# Patient Record
Sex: FEMALE | Race: OTHER | HISPANIC OR LATINO | Employment: UNEMPLOYED | URBAN - METROPOLITAN AREA
[De-identification: names, ages, dates, MRNs, and addresses within clinical notes are randomized per-mention and may not be internally consistent; named-entity substitution may affect disease eponyms.]

---

## 2024-01-10 ENCOUNTER — APPOINTMENT (OUTPATIENT)
Dept: CARDIOLOGY | Facility: HOSPITAL | Age: 24
End: 2024-01-10

## 2024-01-10 ENCOUNTER — HOSPITAL ENCOUNTER (EMERGENCY)
Facility: HOSPITAL | Age: 24
Discharge: HOME | End: 2024-01-10
Attending: EMERGENCY MEDICINE

## 2024-01-10 ENCOUNTER — APPOINTMENT (OUTPATIENT)
Dept: RADIOLOGY | Facility: HOSPITAL | Age: 24
End: 2024-01-10

## 2024-01-10 VITALS
OXYGEN SATURATION: 98 % | HEART RATE: 66 BPM | TEMPERATURE: 97.3 F | DIASTOLIC BLOOD PRESSURE: 68 MMHG | HEIGHT: 60 IN | SYSTOLIC BLOOD PRESSURE: 116 MMHG | BODY MASS INDEX: 29.45 KG/M2 | RESPIRATION RATE: 18 BRPM | WEIGHT: 150 LBS

## 2024-01-10 DIAGNOSIS — R55 NEAR SYNCOPE: Primary | ICD-10-CM

## 2024-01-10 LAB
ALBUMIN SERPL BCP-MCNC: 4.6 G/DL (ref 3.4–5)
ALP SERPL-CCNC: 94 U/L (ref 33–110)
ALT SERPL W P-5'-P-CCNC: 15 U/L (ref 7–45)
ANION GAP SERPL CALC-SCNC: 12 MMOL/L (ref 10–20)
AST SERPL W P-5'-P-CCNC: 20 U/L (ref 9–39)
B-HCG SERPL-ACNC: <2 MIU/ML
BASOPHILS # BLD AUTO: 0.05 X10*3/UL (ref 0–0.1)
BASOPHILS NFR BLD AUTO: 0.3 %
BILIRUB SERPL-MCNC: 0.6 MG/DL (ref 0–1.2)
BUN SERPL-MCNC: 10 MG/DL (ref 6–23)
CALCIUM SERPL-MCNC: 9.7 MG/DL (ref 8.6–10.3)
CARDIAC TROPONIN I PNL SERPL HS: <3 NG/L (ref 0–13)
CARDIAC TROPONIN I PNL SERPL HS: <3 NG/L (ref 0–13)
CHLORIDE SERPL-SCNC: 106 MMOL/L (ref 98–107)
CO2 SERPL-SCNC: 24 MMOL/L (ref 21–32)
CREAT SERPL-MCNC: 0.5 MG/DL (ref 0.5–1.05)
EGFRCR SERPLBLD CKD-EPI 2021: >90 ML/MIN/1.73M*2
EOSINOPHIL # BLD AUTO: 0.05 X10*3/UL (ref 0–0.7)
EOSINOPHIL NFR BLD AUTO: 0.3 %
ERYTHROCYTE [DISTWIDTH] IN BLOOD BY AUTOMATED COUNT: 13.2 % (ref 11.5–14.5)
FLUAV RNA RESP QL NAA+PROBE: NOT DETECTED
FLUBV RNA RESP QL NAA+PROBE: NOT DETECTED
GLUCOSE SERPL-MCNC: 106 MG/DL (ref 74–99)
HCT VFR BLD AUTO: 45.7 % (ref 36–46)
HGB BLD-MCNC: 14.4 G/DL (ref 12–16)
HOLD SPECIMEN: NORMAL
IMM GRANULOCYTES # BLD AUTO: 0.08 X10*3/UL (ref 0–0.7)
IMM GRANULOCYTES NFR BLD AUTO: 0.4 % (ref 0–0.9)
LYMPHOCYTES # BLD AUTO: 1.42 X10*3/UL (ref 1.2–4.8)
LYMPHOCYTES NFR BLD AUTO: 8 %
MAGNESIUM SERPL-MCNC: 2.02 MG/DL (ref 1.6–2.4)
MCH RBC QN AUTO: 25.9 PG (ref 26–34)
MCHC RBC AUTO-ENTMCNC: 31.5 G/DL (ref 32–36)
MCV RBC AUTO: 82 FL (ref 80–100)
MONOCYTES # BLD AUTO: 0.56 X10*3/UL (ref 0.1–1)
MONOCYTES NFR BLD AUTO: 3.1 %
NEUTROPHILS # BLD AUTO: 15.68 X10*3/UL (ref 1.2–7.7)
NEUTROPHILS NFR BLD AUTO: 87.9 %
NRBC BLD-RTO: 0 /100 WBCS (ref 0–0)
PLATELET # BLD AUTO: 392 X10*3/UL (ref 150–450)
POTASSIUM SERPL-SCNC: 3.9 MMOL/L (ref 3.5–5.3)
PROT SERPL-MCNC: 7.5 G/DL (ref 6.4–8.2)
RBC # BLD AUTO: 5.55 X10*6/UL (ref 4–5.2)
SARS-COV-2 RNA RESP QL NAA+PROBE: NOT DETECTED
SODIUM SERPL-SCNC: 138 MMOL/L (ref 136–145)
TSH SERPL-ACNC: 0.57 MIU/L (ref 0.44–3.98)
WBC # BLD AUTO: 17.8 X10*3/UL (ref 4.4–11.3)

## 2024-01-10 PROCEDURE — 99284 EMERGENCY DEPT VISIT MOD MDM: CPT | Performed by: EMERGENCY MEDICINE

## 2024-01-10 PROCEDURE — 84702 CHORIONIC GONADOTROPIN TEST: CPT

## 2024-01-10 PROCEDURE — 36415 COLL VENOUS BLD VENIPUNCTURE: CPT

## 2024-01-10 PROCEDURE — 84443 ASSAY THYROID STIM HORMONE: CPT

## 2024-01-10 PROCEDURE — 80053 COMPREHEN METABOLIC PANEL: CPT

## 2024-01-10 PROCEDURE — 85025 COMPLETE CBC W/AUTO DIFF WBC: CPT

## 2024-01-10 PROCEDURE — 71045 X-RAY EXAM CHEST 1 VIEW: CPT | Performed by: RADIOLOGY

## 2024-01-10 PROCEDURE — 71045 X-RAY EXAM CHEST 1 VIEW: CPT

## 2024-01-10 PROCEDURE — 84484 ASSAY OF TROPONIN QUANT: CPT

## 2024-01-10 PROCEDURE — 87636 SARSCOV2 & INF A&B AMP PRB: CPT | Performed by: EMERGENCY MEDICINE

## 2024-01-10 PROCEDURE — 83735 ASSAY OF MAGNESIUM: CPT

## 2024-01-10 PROCEDURE — 93005 ELECTROCARDIOGRAM TRACING: CPT

## 2024-01-10 PROCEDURE — 99283 EMERGENCY DEPT VISIT LOW MDM: CPT | Mod: 25

## 2024-01-10 PROCEDURE — 99285 EMERGENCY DEPT VISIT HI MDM: CPT | Performed by: EMERGENCY MEDICINE

## 2024-01-10 ASSESSMENT — COLUMBIA-SUICIDE SEVERITY RATING SCALE - C-SSRS
2. HAVE YOU ACTUALLY HAD ANY THOUGHTS OF KILLING YOURSELF?: NO
6. HAVE YOU EVER DONE ANYTHING, STARTED TO DO ANYTHING, OR PREPARED TO DO ANYTHING TO END YOUR LIFE?: NO
1. IN THE PAST MONTH, HAVE YOU WISHED YOU WERE DEAD OR WISHED YOU COULD GO TO SLEEP AND NOT WAKE UP?: NO

## 2024-01-10 ASSESSMENT — LIFESTYLE VARIABLES
HAVE YOU EVER FELT YOU SHOULD CUT DOWN ON YOUR DRINKING: NO
REASON UNABLE TO ASSESS: NO
EVER HAD A DRINK FIRST THING IN THE MORNING TO STEADY YOUR NERVES TO GET RID OF A HANGOVER: NO
EVER FELT BAD OR GUILTY ABOUT YOUR DRINKING: NO
HAVE PEOPLE ANNOYED YOU BY CRITICIZING YOUR DRINKING: NO

## 2024-01-10 ASSESSMENT — PAIN SCALES - GENERAL: PAINLEVEL_OUTOF10: 0 - NO PAIN

## 2024-01-10 ASSESSMENT — PAIN - FUNCTIONAL ASSESSMENT: PAIN_FUNCTIONAL_ASSESSMENT: 0-10

## 2024-01-10 NOTE — DISCHARGE INSTRUCTIONS
Seek immediate medical attention if you develop: worsening lightheadedness or dizziness,  chest pain, confusion, nausea, vomiting, weakness, numbness, tingling, excessive sweating, worsening shortness of breath, difficulty breathing, loss of motion in your arms or legs, or any new or worsening symptoms.    Follow up your labs and imaging results with your doctor.

## 2024-01-10 NOTE — ED PROVIDER NOTES
HPI   Chief Complaint   Patient presents with    Syncope     Pt reports had near syncopal episode at 1300 today. Reports she was able to sit down prior to passing out.   Reports nausea along with epiosde.        Patient is a 23-year-old female with no past medical history who is presenting to the emergency department from urgent care with a 30-minute episode of nausea and lightheadedness.  She was getting ready for work at approximately 1300 and felt nauseous, lightheaded, started shaking, could not breathe, and her fingers felt tingly.  She was on the phone with her mother, who reports that when she gave the patient instructions the patient had difficulty remembering the correct order.  Denies any urinary incontinence, tongue biting, loss of consciousness.  She remembers the entire event.  Notes that her period just began.  Denies any chest pain, vomiting, dysuria, hematuria, constipation, diarrhea, palpitations, seizures, or loss of consciousness.    To note, the patient had a similar episode a year ago but did not go to the emergency department.  At that time episode lasted 15 minutes, and she associated symptoms with the beginning of her period.    12 point review of systems was completed and is negative unless otherwise noted as above.    PMHx: As Above  PSurgHx: None  Meds: None  Allergies: NKDA  SHx: 1-2 alcoholic beverages a week.  Rare marijuana use.  Denies any tobacco use.  Lives with a roommate and 2 cats.  Works at a [employer].      History provided by:  Patient and parent    [place] Coma Scale Score: 15    Patient History   Past Medical History:   Diagnosis Date    Allergy status to unspecified drugs, medicaments and biological substances 08/27/2015    History of seasonal allergies     Past Surgical History:   Procedure Laterality Date    OTHER SURGICAL HISTORY  09/03/2015    History Of Prior Surgery     No family history on file.  Social History     Tobacco Use    Smoking status: Never    Smokeless  tobacco: Never   Substance Use Topics    Alcohol use: Not Currently    Drug use: Not Currently       Physical Exam   ED Triage Vitals [01/10/24 1511]   Temp Heart Rate Resp BP   36.3 °C (97.3 °F) 60 16 117/66      SpO2 Temp Source Heart Rate Source Patient Position   99 % Temporal Monitor Sitting      BP Location FiO2 (%)     Right arm --       Physical Exam  Constitutional:       General: She is not in acute distress.     Appearance: Normal appearance. She is not toxic-appearing.   HENT:      Head: Normocephalic and atraumatic.   Eyes:      General: No scleral icterus.     Extraocular Movements: Extraocular movements intact.   Cardiovascular:      Rate and Rhythm: Normal rate and regular rhythm.      Heart sounds: No murmur heard.     No friction rub. No gallop.   Pulmonary:      Effort: Pulmonary effort is normal. No respiratory distress.      Breath sounds: No wheezing, rhonchi or rales.   Abdominal:      General: Abdomen is flat. There is no distension.      Palpations: Abdomen is soft.      Tenderness: There is no abdominal tenderness. There is no guarding.   Musculoskeletal:         General: No swelling or tenderness.      Cervical back: Neck supple. No tenderness.   Skin:     General: Skin is warm and dry.      Capillary Refill: Capillary refill takes less than 2 seconds.   Neurological:      General: No focal deficit present.      Mental Status: She is alert and oriented to person, place, and time.      Motor: No weakness.   Psychiatric:         Mood and Affect: Mood normal.     ED Course & MDM   ED Course as of 01/10/24 1903   Wed Chente 10, 2024   1546 EKG ordered, and shows sinus rhythm with marked sinus arrhythmia ventricular rate 60, MI interval 136, QTc 388. [RT]   1552 CBC, CMP, magnesium, hCG, TSH, troponin all ordered.  Patient is PERC negative, score 0 no indication for D-dimer. [RT]   1636 CBC showed leukocytosis with WBC 17.8, no anemia. [RT]   1702 CMP unremarkable. Magnesium WNL. hCG negative.  Initial troponin negative. [RT]   1742 TSH WNL. [RT]   1747 Repeat troponin negative.  COVID and influenza swab negative. [RT]   1814 CXR ordered and showed no evidence of acute cardiopulmonary process. [RT]   1854 Results discussed with the patient along with plan for discharge, patient was amenable to discharge at this time. [RT]      ED Course User Index  [RT] Celestine Borrero DO         Diagnoses as of 01/10/24 1903   Near syncope       Medical Decision Making  Patient is a 23-year-old female with no past medical history who presented to the emergency department with an episode of nausea and lightheadedness.  Differential diagnosis includes ACS, anxiety, dehydration.  Workup included CBC, CMP, magnesium, hCG, troponin, TSH, EKG, CXR.  No indication for D-dimer to evaluate for PE, the patient is PERC negative with a score of 0.  CBC showed leukocytosis with WBC 17.8, no anemia.  CMP unremarkable.  Magnesium WNL.  hCG negative.  Troponins negative.  TSH WNL. EKG showed sinus rhythm with marked sinus arrhythmia with ventricular rate 60, CA interval 136, QTc 388.  CXR showed no evidence of acute cardiopulmonary process. Results discussed with the patient and the patient's mother at bedside along with plan for discharge, all parties amenable to discharge at this time.  The patient was then deemed appropriate for discharge from the emergency department with strict instructions to follow-up with her primary care physician as soon as possible for emergency room follow-up.  Strict return precautions given    The assessment and plan were discussed with the attending physician,  Celestine Borrero DO PGY-1    =================Attending note===============    The patient was seen by the resident/fellow.  I have personally performed a substantive portion of the encounter.  I have seen and examined the patient; agree with the workup, evaluation, MDM,   management and diagnosis.  The care plan has been discussed with the resident; I  have reviewed the resident's note and agree with the documented findings.      This is a 23 y.o. female who presents to ER with lightheadedness and near syncope.  She not actually passed out.  She did have some nausea and some tingling in her hands.  This was around 1:00 PM when this started.  She states that she had some brief disorientation.  She states that she was talking to her mom and she said she needed to call her mom rather than her friend who she wanted to call.  States that her mom was telling her things to do to make yourself feel better and she was unsure what order she should do them in.  There is no significant neurologic deficits.  Patient did feel short of breath during this.  There is no incontinence of urine or stool.  She not bite her tongue.  She had some issues with lightheadedness about a year and a half ago.  She has no chronic medical conditions.  She does have some occasional anxiety.  Heart is regular.  Lungs are clear.  Abdomen is soft and nontender.  She is awake and alert and oriented.  She feels back to normal currently.    CMP unremarkable.  White count slightly elevated.  No significant anemia.    EKG: Sinus rhythm with a sinus arrhythmia.  Ventricular rate 60.  .  QTc 388.  No ST elevations.            ==========================================           Celestine Borrero DO  Resident  01/10/24 0094

## 2024-01-12 LAB
ATRIAL RATE: 60 BPM
P AXIS: 52 DEGREES
P OFFSET: 206 MS
P ONSET: 153 MS
PR INTERVAL: 136 MS
Q ONSET: 221 MS
QRS COUNT: 10 BEATS
QRS DURATION: 84 MS
QT INTERVAL: 388 MS
QTC CALCULATION(BAZETT): 388 MS
QTC FREDERICIA: 388 MS
R AXIS: 70 DEGREES
T AXIS: 40 DEGREES
T OFFSET: 415 MS
VENTRICULAR RATE: 60 BPM

## 2024-11-20 ENCOUNTER — APPOINTMENT (OUTPATIENT)
Dept: PEDIATRICS | Facility: CLINIC | Age: 24
End: 2024-11-20
Payer: MEDICAID